# Patient Record
Sex: FEMALE | Race: WHITE | NOT HISPANIC OR LATINO | URBAN - METROPOLITAN AREA
[De-identification: names, ages, dates, MRNs, and addresses within clinical notes are randomized per-mention and may not be internally consistent; named-entity substitution may affect disease eponyms.]

---

## 2017-10-30 ENCOUNTER — IMPORTED ENCOUNTER (OUTPATIENT)
Dept: URBAN - METROPOLITAN AREA CLINIC 9 | Facility: CLINIC | Age: 67
End: 2017-10-30

## 2018-11-26 ENCOUNTER — IMPORTED ENCOUNTER (OUTPATIENT)
Dept: URBAN - METROPOLITAN AREA CLINIC 9 | Facility: CLINIC | Age: 68
End: 2018-11-26

## 2020-02-10 NOTE — PATIENT DISCUSSION
CATARACTS, OD - VISUALLY SIGNIFICANT. UNSURE OF VISUAL POTENTIAL DUE TO DIABETIC RETINOPATHY, HX FALL.  REFER TO [facility]

## 2020-02-10 NOTE — PATIENT DISCUSSION
NONPROLIFERATIVE DIABETIC RETINOPATHY:ENCOURAGE GOOD BLOOD SUGAR AND BLOOD PRESSURE CONTROL.  REFER TO [facility]

## 2021-05-24 ENCOUNTER — IMPORTED ENCOUNTER (OUTPATIENT)
Dept: URBAN - METROPOLITAN AREA CLINIC 9 | Facility: CLINIC | Age: 71
End: 2021-05-24

## 2021-06-01 ENCOUNTER — IMPORTED ENCOUNTER (OUTPATIENT)
Dept: URBAN - METROPOLITAN AREA CLINIC 9 | Facility: CLINIC | Age: 71
End: 2021-06-01

## 2021-07-22 ENCOUNTER — IMPORTED ENCOUNTER (OUTPATIENT)
Dept: URBAN - METROPOLITAN AREA CLINIC 9 | Facility: CLINIC | Age: 71
End: 2021-07-22

## 2021-07-27 ENCOUNTER — IMPORTED ENCOUNTER (OUTPATIENT)
Dept: URBAN - METROPOLITAN AREA CLINIC 9 | Facility: CLINIC | Age: 71
End: 2021-07-27

## 2021-08-19 ENCOUNTER — IMPORTED ENCOUNTER (OUTPATIENT)
Dept: URBAN - METROPOLITAN AREA CLINIC 9 | Facility: CLINIC | Age: 71
End: 2021-08-19

## 2021-08-24 ENCOUNTER — IMPORTED ENCOUNTER (OUTPATIENT)
Dept: URBAN - METROPOLITAN AREA CLINIC 9 | Facility: CLINIC | Age: 71
End: 2021-08-24

## 2021-10-16 ASSESSMENT — TONOMETRY
OD_IOP_MMHG: 11
OS_IOP_MMHG: 16
OD_IOP_MMHG: 18
OD_IOP_MMHG: 18
OD_IOP_MMHG: 14
OS_IOP_MMHG: 22
OS_IOP_MMHG: 10
OD_IOP_MMHG: 16
OS_IOP_MMHG: 13
OS_IOP_MMHG: 19

## 2021-10-16 ASSESSMENT — KERATOMETRY
OD_K1POWER_DIOPTERS: 44
OS_AXISANGLE2_DEGREES: 35
OS_K1POWER_DIOPTERS: 44
OS_K2POWER_DIOPTERS: 44.5
OS_AXISANGLE_DEGREES: 125
OD_AXISANGLE2_DEGREES: 90
OD_K2POWER_DIOPTERS: 44
OD_AXISANGLE_DEGREES: 180

## 2021-10-16 ASSESSMENT — VISUAL ACUITY
OS_CC: 20/20 -2 SN
OD_CC: 20/20 SN
OS_SC: 20/20 - SN
OS_CC: 20/20 - SN
OS_CC: 20/25 SN
OD_CC: 20/25 - SN
OD_SC: 20/30 SN
OD_CC: 20/25 SN
OD_CC: 20/20 SN
OD_SC: 20/40 SN
OD_CC: 20/25 + SN
OS_CC: 20/25 SN
OS_CC: 20/30 SN
OD_CC: 20/25 + SN
OS_SC: 20/25 SN

## 2022-06-20 ENCOUNTER — ESTABLISHED PATIENT (OUTPATIENT)
Dept: URBAN - METROPOLITAN AREA CLINIC 9 | Facility: CLINIC | Age: 72
End: 2022-06-20

## 2022-06-20 DIAGNOSIS — H26.493: ICD-10-CM

## 2022-06-20 DIAGNOSIS — H35.3131: ICD-10-CM

## 2022-06-20 PROCEDURE — 92015 DETERMINE REFRACTIVE STATE: CPT

## 2022-06-20 PROCEDURE — 92134 CPTRZ OPH DX IMG PST SGM RTA: CPT

## 2022-06-20 PROCEDURE — 92014 COMPRE OPH EXAM EST PT 1/>: CPT

## 2022-06-20 ASSESSMENT — KERATOMETRY
OD_AXISANGLE2_DEGREES: 90
OD_AXISANGLE_DEGREES: 0
OS_K2POWER_DIOPTERS: 44.75
OS_AXISANGLE_DEGREES: 143
OD_K2POWER_DIOPTERS: 43.75
OS_AXISANGLE2_DEGREES: 53
OD_K1POWER_DIOPTERS: 43.75
OS_K1POWER_DIOPTERS: 44.25

## 2022-06-20 ASSESSMENT — VISUAL ACUITY
OD_GLARE: 20/40
OD_CC: 20/25+1
OU_CC: 20/20
OS_CC: 20/20
OS_GLARE: 20/40

## 2022-06-20 ASSESSMENT — TONOMETRY
OD_IOP_MMHG: 8
OS_IOP_MMHG: 11

## 2022-06-24 RX ORDER — ROSUVASTATIN CALCIUM 20 MG/1
TABLET, COATED ORAL
COMMUNITY

## 2022-06-24 RX ORDER — METOPROLOL SUCCINATE 25 MG/1
TABLET, EXTENDED RELEASE ORAL
COMMUNITY

## 2022-06-24 RX ORDER — VALSARTAN 80 MG/1
TABLET ORAL
COMMUNITY
Start: 2022-03-28

## 2022-09-10 PROBLEM — N18.9 CHRONIC RENAL FAILURE SYNDROME: Status: ACTIVE | Noted: 2022-09-10

## 2022-09-10 PROBLEM — J32.0 CHRONIC LEFT MAXILLARY SINUSITIS: Status: ACTIVE | Noted: 2022-09-10

## 2022-09-10 PROBLEM — M46.1 SACROILIITIS (HCC): Status: ACTIVE | Noted: 2022-09-10

## 2022-09-10 PROBLEM — I83.90 ASYMPTOMATIC VARICOSE VEINS: Status: ACTIVE | Noted: 2022-09-10

## 2022-09-10 PROBLEM — E55.9 VITAMIN D DEFICIENCY DISEASE: Status: ACTIVE | Noted: 2022-09-10

## 2022-09-10 PROBLEM — M81.0 AGE-RELATED OSTEOPOROSIS WITHOUT CURRENT PATHOLOGICAL FRACTURE: Status: ACTIVE | Noted: 2022-09-10

## 2022-09-10 PROBLEM — I10 HYPERTENSION: Status: ACTIVE | Noted: 2022-09-10

## 2022-09-10 PROBLEM — I47.1 PSVT (PAROXYSMAL SUPRAVENTRICULAR TACHYCARDIA) (HCC): Status: ACTIVE | Noted: 2022-09-10

## 2022-09-10 PROBLEM — Q79.8: Status: ACTIVE | Noted: 2022-09-10

## 2022-09-10 PROBLEM — F43.21 GRIEF: Status: ACTIVE | Noted: 2022-09-10

## 2022-09-10 PROBLEM — M51.37 DEGENERATION OF LUMBAR OR LUMBOSACRAL INTERVERTEBRAL DISC: Status: ACTIVE | Noted: 2022-09-10

## 2022-09-10 PROBLEM — N18.2 STAGE 2 CHRONIC KIDNEY DISEASE: Status: ACTIVE | Noted: 2022-09-10

## 2022-09-10 PROBLEM — E78.5 HYPERLIPIDEMIA: Status: ACTIVE | Noted: 2022-09-10

## 2022-09-10 PROBLEM — N95.8 OTHER SPECIFIED MENOPAUSAL AND PERIMENOPAUSAL DISORDERS: Status: ACTIVE | Noted: 2022-09-10

## 2022-09-10 PROBLEM — N28.1 ACQUIRED CYST OF KIDNEY: Status: ACTIVE | Noted: 2022-09-10

## 2022-09-10 PROBLEM — N13.30 HYDRONEPHROSIS: Status: ACTIVE | Noted: 2022-09-10

## 2022-09-10 PROBLEM — K21.9 GERD WITHOUT ESOPHAGITIS: Status: ACTIVE | Noted: 2022-09-10

## 2022-09-10 PROBLEM — E83.52 HYPERCALCEMIA: Status: ACTIVE | Noted: 2022-09-10

## 2023-10-09 ENCOUNTER — ESTABLISHED PATIENT (OUTPATIENT)
Facility: LOCATION | Age: 73
End: 2023-10-09

## 2023-10-09 DIAGNOSIS — H35.3131: ICD-10-CM

## 2023-10-09 DIAGNOSIS — H26.493: ICD-10-CM

## 2023-10-09 DIAGNOSIS — H35.033: ICD-10-CM

## 2023-10-09 PROCEDURE — 92134 CPTRZ OPH DX IMG PST SGM RTA: CPT

## 2023-10-09 PROCEDURE — 92014 COMPRE OPH EXAM EST PT 1/>: CPT

## 2023-10-09 PROCEDURE — 92015 DETERMINE REFRACTIVE STATE: CPT

## 2023-10-09 ASSESSMENT — KERATOMETRY
OD_K1POWER_DIOPTERS: 43.75
OD_AXISANGLE_DEGREES: 0
OS_AXISANGLE_DEGREES: 143
OS_K2POWER_DIOPTERS: 44.75
OS_AXISANGLE2_DEGREES: 37
OS_AXISANGLE_DEGREES: 127
OD_K2POWER_DIOPTERS: 43.75
OS_K1POWER_DIOPTERS: 44.25
OD_AXISANGLE2_DEGREES: 90
OS_AXISANGLE2_DEGREES: 53

## 2023-10-09 ASSESSMENT — VISUAL ACUITY
OD_CC: 20/25
OS_CC: 20/25+1
OD_SC: 20/25
OD_GLARE: 20/25
OS_SC: 20/25
OS_GLARE: 20/25

## 2023-10-09 ASSESSMENT — TONOMETRY
OD_IOP_MMHG: 17
OS_IOP_MMHG: 14

## 2025-04-15 ENCOUNTER — COMPREHENSIVE EXAM (OUTPATIENT)
Age: 75
End: 2025-04-15

## 2025-04-15 DIAGNOSIS — H35.3131: ICD-10-CM

## 2025-04-15 DIAGNOSIS — Z96.1: ICD-10-CM

## 2025-04-15 DIAGNOSIS — H35.033: ICD-10-CM

## 2025-04-15 DIAGNOSIS — H02.831: ICD-10-CM

## 2025-04-15 DIAGNOSIS — H02.834: ICD-10-CM

## 2025-04-15 DIAGNOSIS — H43.813: ICD-10-CM

## 2025-04-15 PROCEDURE — 92014 COMPRE OPH EXAM EST PT 1/>: CPT

## 2025-04-15 PROCEDURE — 92134 CPTRZ OPH DX IMG PST SGM RTA: CPT

## 2025-04-15 PROCEDURE — 92015 DETERMINE REFRACTIVE STATE: CPT
